# Patient Record
Sex: FEMALE | Race: WHITE | URBAN - METROPOLITAN AREA
[De-identification: names, ages, dates, MRNs, and addresses within clinical notes are randomized per-mention and may not be internally consistent; named-entity substitution may affect disease eponyms.]

---

## 2017-05-09 ENCOUNTER — TRANSFERRED RECORDS (OUTPATIENT)
Dept: HEALTH INFORMATION MANAGEMENT | Facility: CLINIC | Age: 82
End: 2017-05-09

## 2017-09-15 ENCOUNTER — OFFICE VISIT (OUTPATIENT)
Dept: OPHTHALMOLOGY | Facility: CLINIC | Age: 82
End: 2017-09-15

## 2017-09-15 DIAGNOSIS — H52.223 REGULAR ASTIGMATISM, BILATERAL: ICD-10-CM

## 2017-09-15 DIAGNOSIS — H35.369 DRUSEN (DEGENERATIVE) OF RETINA, UNSPECIFIED LATERALITY: ICD-10-CM

## 2017-09-15 DIAGNOSIS — H16.229 KERATITIS SICCA: ICD-10-CM

## 2017-09-15 DIAGNOSIS — H25.10 SENILE NUCLEAR SCLEROSIS, UNSPECIFIED LATERALITY: ICD-10-CM

## 2017-09-15 DIAGNOSIS — H26.499 PCO (POSTERIOR CAPSULAR OPACIFICATION), UNSPECIFIED LATERALITY: Primary | ICD-10-CM

## 2017-09-15 ASSESSMENT — EXTERNAL EXAM - RIGHT EYE: OD_EXAM: NORMAL

## 2017-09-15 ASSESSMENT — CONF VISUAL FIELD
OD_NORMAL: 1
OS_NORMAL: 1
METHOD: COUNTING FINGERS

## 2017-09-15 ASSESSMENT — REFRACTION
OS_ADD: +2.75
OD_AXIS: 017
OD_ADD: +2.75
OS_CYLINDER: +1.75
OS_AXIS: 156
OD_SPHERE: -0.25
OS_SPHERE: -0.25
OD_CYLINDER: +0.75

## 2017-09-15 ASSESSMENT — REFRACTION_MANIFEST
OS_SPHERE: PLANO
OD_AXIS: 180
OS_AXIS: 150
OS_ADD: +2.75
OD_ADD: +2.75
OD_SPHERE: PLANO
OS_CYLINDER: +1.75
OD_CYLINDER: +0.75

## 2017-09-15 ASSESSMENT — CUP TO DISC RATIO
OS_RATIO: 0.25
OD_RATIO: 0.25

## 2017-09-15 ASSESSMENT — REFRACTION_WEARINGRX
OD_SPHERE: PLANO
SPECS_TYPE: PAL
OS_ADD: +2.75
OS_AXIS: 155
OD_CYLINDER: +0.50
OD_AXIS: 008
OD_ADD: +2.75
OS_CYLINDER: +0.75
OS_SPHERE: -0.75

## 2017-09-15 ASSESSMENT — TONOMETRY
OS_IOP_MMHG: 12
OD_IOP_MMHG: 15
IOP_METHOD: ICARE

## 2017-09-15 ASSESSMENT — VISUAL ACUITY
OS_SC: 20/40
METHOD: SNELLEN - LINEAR
OD_CC+: -2
OD_CC: 20/25
CORRECTION_TYPE: GLASSES
OS_SC+: -2

## 2017-09-15 ASSESSMENT — SLIT LAMP EXAM - LIDS
COMMENTS: NORMAL
COMMENTS: NORMAL

## 2017-09-15 ASSESSMENT — EXTERNAL EXAM - LEFT EYE: OS_EXAM: NORMAL

## 2017-09-15 NOTE — MR AVS SNAPSHOT
After Visit Summary   9/15/2017    Hilda Garcia    MRN: [id number]           Patient Information     Date Of Birth          1935        Visit Information        Provider Department      9/15/2017 2:00 PM Dmitriy Jeffers MD [facility]  [facility]        Today's Diagnoses     PCO (posterior capsular opacification), unspecified laterality - Left Eye    -  1    Drusen (degenerative) of retina, unspecified laterality - Both Eyes        Keratitis sicca - Both Eyes        Regular astigmatism, bilateral - Both Eyes        Senile nuclear sclerosis, unspecified laterality - Right Eye           Follow-ups after your visit        Follow-up notes from your care team     Return in about 2 months (around 11/15/2017) for Follow Up, PCO, Kerattis Sicca.      Your next 10 appointments already scheduled     Nov 22, 2017 11:00 AM CST   Return Visit with Dmitriy Jeffers MD   [facility])    [facility]  [address] [address] [phone]              Who to contact     Please call your clinic at [phone] to:    Ask questions about your health    Make or cancel appointments    Discuss your medicines    Learn about your test results    Speak to your doctor   If you have compliments or concerns about an experience at your clinic, or if you wish to file a complaint, please contact [facility] Patient Relations at [phone] or email us at [email or web address]         Additional Information About Your Visit        MyChart Information     Lending Workst is an electronic gateway that provides easy, online access to your medical records. With Guidefitter, you can request a clinic appointment, read your test results, renew a prescription or communicate with your care team.     To sign up for Lending Workst visit the website at [email or web address]   You will  be asked to enter the access code listed below, as well as some personal information. Please follow the directions to create your username and password.     Your access code is: [email or web address]  Expires: 2017  6:30 AM     Your access code will  in 90 days. If you need help or a new code, please contact your [facility] or call [phone] for assistance.        Care EveryWhere ID     This is your Care EveryWhere ID. This could be used by other organizations to access your [place] medical records  [id number]         Blood Pressure from Last 3 Encounters:   No data found for BP    Weight from Last 3 Encounters:   No data found for Wt              We Performed the Following     REFRACTION        Primary Care Provider    None Specified       No primary provider on file.        Equal Access to Services     ROBERTA ESPOSITO : Hadii ray Johnson, waurielda lujoeadaha, qaybta kaalmada mehreen, nils casillas . So [facility] [phone].    ATENCIÓN: Si habla español, tiene a ho disposición servicios gratuitos de asistencia lingüística. Llame al [phone].    We comply with applicable federal civil rights laws and [place] laws. We do not discriminate on the basis of race, color, national origin, age, disability sex, sexual orientation or gender identity.            Thank you!     Thank you for choosing [facility]  for your care. Our goal is always to provide you with excellent care. Hearing back from our patients is one way we can continue to improve our services. Please take a few minutes to complete the written survey that you may receive in the mail after your visit with us. Thank you!             Your Updated Medication List - Protect others around you: Learn how to safely use, store and throw away your medicines at www.disposemymeds.org.      Notice  As of 9/15/2017 11:59 PM    You have not been prescribed any  medications.

## 2017-09-15 NOTE — NURSING NOTE
Chief Complaints and History of Present Illnesses   Patient presents with     Second Opinion     HPI    Affected eye(s):  Left   Symptoms:     Decreased vision      Duration:  1 year   Frequency:  Constant       Do you have eye pain now?:  Yes   Location:  OS   Pain Level:  No Pain (1)   Pain Duration:  1 year   Pain Characteristics:  Aching   Other:  relief with cold pack      Comments:  CE/IOL of LE one year ago / complications of immediate poor VA / 'film over VF' / saw Retina specialist 3 weeks ago / told scar tissue underneath the lens.  Cataract in RE   Early dry AMD in BE / states has cyn Lozano COT 2:13 PM 09/15/2017

## 2017-09-17 NOTE — PROGRESS NOTES
Assessment & Plan      Hilda Garcia is a 81 year old female with the following diagnoses:   (H26.499) PCO (posterior capsular opacification), unspecified laterality - Left Eye  (primary encounter diagnosis)  Comment: Moderate  Plan: Will likely need YAG soon    (H35.369) Drusen (degenerative) of retina, unspecified laterality - Both Eyes  Comment: Mild  Plan: AREDS-2 bid    (H16.9) Keratitis sicca - Both Eyes  Comment: Moderate  Plan: Preservative free AT's    (H52.223) Regular astigmatism, bilateral - Both Eyes  Comment:Change  Plan: REFRACTION        Rx    (H25.10) Senile nuclear sclerosis, unspecified laterality - Right Eye  Comment: Mild  Plan: Follow     -----------------------------------------------------------------------------------      Patient disposition:   Return in about 2 months (around 11/15/2017) for Follow Up, PCO, Kerattis Sicca. or sooner as needed.    Complete documentation of historical and exam elements from today's encounter can  be found in the full encounter summary report (not reduplicated in this progress  note). I personally obtained the chief complaint(s) and history of present illness. I  confirmed and edited as necessary the review of systems, past medical/surgical  history, family history, social history, and examination findings as documented by  others; and I examined the patient myself. I personally reviewed the relevant tests,  images, and reports as documented above. I formulated and edited as necessary the  assessment and plan and discussed the findings and management plan with the  patient and family.    WANG Jeffers M.D

## 2017-11-22 ENCOUNTER — OFFICE VISIT (OUTPATIENT)
Dept: OPHTHALMOLOGY | Facility: CLINIC | Age: 82
End: 2017-11-22

## 2017-11-22 DIAGNOSIS — H26.499 PCO (POSTERIOR CAPSULAR OPACIFICATION), UNSPECIFIED LATERALITY: Primary | ICD-10-CM

## 2017-11-22 ASSESSMENT — TONOMETRY
IOP_METHOD: ICARE
OS_IOP_MMHG: 11
OD_IOP_MMHG: 16

## 2017-11-22 ASSESSMENT — REFRACTION_WEARINGRX
OD_SPHERE: -0.25
SPECS_TYPE: PAL
OD_ADD: +2.75
OS_CYLINDER: +1.75
OD_CYLINDER: +0.75
OS_AXIS: 156
OS_SPHERE: -0.25
OD_AXIS: 017
OS_ADD: +2.75

## 2017-11-22 ASSESSMENT — CONF VISUAL FIELD
OD_NORMAL: 1
METHOD: COUNTING FINGERS
OS_NORMAL: 1

## 2017-11-22 ASSESSMENT — EXTERNAL EXAM - LEFT EYE: OS_EXAM: NORMAL

## 2017-11-22 ASSESSMENT — SLIT LAMP EXAM - LIDS
COMMENTS: NORMAL
COMMENTS: NORMAL

## 2017-11-22 ASSESSMENT — VISUAL ACUITY
CORRECTION_TYPE: GLASSES
OS_CC: 20/25-3
METHOD: SNELLEN - LINEAR
OD_CC: 20/30+

## 2017-11-22 ASSESSMENT — EXTERNAL EXAM - RIGHT EYE: OD_EXAM: NORMAL

## 2017-11-22 NOTE — PROGRESS NOTES
Assessment & Plan      Hilda Garcia is a 82 year old female with the following diagnoses:   (H26.499) PCO (posterior capsular opacification), unspecified laterality - Left Eye  (primary encounter diagnosis)  Comment: Significant visual impairment  Plan: YAG Capsulotomy OS (left eye)        Decision to do surgery made at today's visit.  Patient's dry ARMD discussed as possible limitation to final VA      -----------------------------------------------------------------------------------      Patient disposition:   Return in about 2 weeks (around 12/6/2017) for Follow Up-YAG Capsulotomy OS. or sooner as needed.    Complete documentation of historical and exam elements from today's encounter can  be found in the full encounter summary report (not reduplicated in this progress  note). I personally obtained the chief complaint(s) and history of present illness. I  confirmed and edited as necessary the review of systems, past medical/surgical  history, family history, social history, and examination findings as documented by  others; and I examined the patient myself. I personally reviewed the relevant tests,  images, and reports as documented above. I formulated and edited as necessary the  assessment and plan and discussed the findings and management plan with the  patient and family.    WANG Jeffers M.D

## 2017-11-22 NOTE — MR AVS SNAPSHOT
After Visit Summary   2017    Hilda Garcia    MRN: [id number]           Patient Information     Date Of Birth          1935        Visit Information        Provider Department      2017 10:40 AM Dmitriy Jeffers MD [facility]        Today's Diagnoses     PCO (posterior capsular opacification), unspecified laterality - Left Eye    -  1       Follow-ups after your visit        Your next 10 appointments already scheduled     Dec 05, 2017  9:00 AM CST   Return Visit with Dmitriy Jeffers MD   [facility]  [facility] ([facility])    [facility]  [address] [facility] [address]  [facility] [address] [phone]              Who to contact     Please call your clinic at [phone] to:    Ask questions about your health    Make or cancel appointments    Discuss your medicines    Learn about your test results    Speak to your doctor   If you have compliments or concerns about an experience at your clinic, or if you wish to file a complaint, please contact [facility] Patient Relations at [phone] or email us at [email or web address]         Additional Information About Your Visit        MyChart Information     Fliqqhart is an electronic gateway that provides easy, online access to your medical records. With Wochacha, you can request a clinic appointment, read your test results, renew a prescription or communicate with your care team.     To sign up for Mediaflyt visit the website at [email or web address] SMSt   You will be asked to enter the access code listed below, as well as some personal information. Please follow the directions to create your username and password.     Your access code is: [email or web address]  Expires: 2017  5:30 AM     Your access code will  in 90 days. If you need help or a new code, please contact your [facility]  Clinic or call [phone] for assistance.        Care EveryWhere ID     This is your Care EveryWhere ID. This could be used by other organizations to access your [place] medical records  [id number]         Blood Pressure from Last 3 Encounters:   No data found for BP    Weight from Last 3 Encounters:   No data found for Wt              We Performed the Following     YAG Capsulotomy OS (left eye)        Primary Care Provider    None Specified       No primary provider on file.        Equal Access to Services     GILMA [facility] HospitalMARSHALL : Hadii aad ku hadmorroo Sorashadali, waaxda luqadaha, qaybta kaalmada adesamda, nils bethbethalicia casillas . So [facility] [place] [facility] [phone].    ATENCIÓN: Si habla espchitra, tiene a ho disposición servicios gratuitos de asistencia lingüística. Llame al [phone].    We comply with applicable federal civil rights laws and [place] laws. We do not discriminate on the basis of race, color, national origin, age, disability, sex, sexual orientation, or gender identity.            Thank you!     Thank you for choosing [facility] A [facility]  for your care. Our goal is always to provide you with excellent care. Hearing back from our patients is one way we can continue to improve our services. Please take a few minutes to complete the written survey that you may receive in the mail after your visit with us. Thank you!             Your Updated Medication List - Protect others around you: Learn how to safely use, store and throw away your medicines at www.disposemymeds.org.      Notice  As of 11/22/2017 11:21 AM    You have not been prescribed any medications.

## 2017-11-22 NOTE — NURSING NOTE
Chief Complaints and History of Present Illnesses   Patient presents with     Follow Up For     PCO of LE      HPI    Affected eye(s):  Left   Symptoms:     Blurred vision      Duration:  2 months   Frequency:  Constant       Do you have eye pain now?:  No      Comments:  Pt states she hasn't noticed any vision changes since last visit, VA is poor in LE, interested in getting YAG laser done today     TAQUERIA Davis 9:54 AM 11/22/2017

## 2017-12-20 ENCOUNTER — OFFICE VISIT (OUTPATIENT)
Dept: OPHTHALMOLOGY | Facility: CLINIC | Age: 82
End: 2017-12-20
Payer: MEDICARE

## 2017-12-20 DIAGNOSIS — H26.499 PCO (POSTERIOR CAPSULAR OPACIFICATION), UNSPECIFIED LATERALITY: Primary | ICD-10-CM

## 2017-12-20 ASSESSMENT — VISUAL ACUITY
OD_SC: 20/20
OS_SC+: -2/+3
CORRECTION_TYPE: GLASSES
OD_SC+: -2
OS_SC: 20/30
METHOD: SNELLEN - LINEAR

## 2017-12-20 ASSESSMENT — REFRACTION_MANIFEST
OD_AXIS: 017
OD_SPHERE: PLANO
OS_AXIS: 155
OS_CYLINDER: +1.75
OS_SPHERE: -0.50
OS_ADD: +2.75
OD_ADD: +2.75
OD_CYLINDER: +0.75

## 2017-12-20 ASSESSMENT — REFRACTION_WEARINGRX
OS_AXIS: 156
OS_CYLINDER: +1.75
OD_ADD: +2.75
SPECS_TYPE: PAL
OS_ADD: +2.75
OD_AXIS: 017
OD_CYLINDER: +0.75
OD_SPHERE: PLANO
OS_SPHERE: -0.25

## 2017-12-20 ASSESSMENT — CONF VISUAL FIELD
OD_NORMAL: 1
OS_NORMAL: 1

## 2017-12-20 ASSESSMENT — SLIT LAMP EXAM - LIDS
COMMENTS: NORMAL
COMMENTS: NORMAL

## 2017-12-20 ASSESSMENT — EXTERNAL EXAM - LEFT EYE: OS_EXAM: NORMAL

## 2017-12-20 ASSESSMENT — TONOMETRY
IOP_METHOD: ICARE
OS_IOP_MMHG: 14
OD_IOP_MMHG: 18

## 2017-12-20 ASSESSMENT — EXTERNAL EXAM - RIGHT EYE: OD_EXAM: NORMAL

## 2017-12-20 NOTE — NURSING NOTE
Chief Complaints and History of Present Illnesses   Patient presents with     Post-op Yag Capsulotomy     of LE     HPI    Affected eye(s):  Left   Symptoms:     Blurred vision   No floaters   No flashes   Dryness      Duration:  1 month   Frequency:  Constant       Do you have eye pain now?:  No      Comments:  Still notes a film over LE / always instilling art tears 5 to 6 times a day / film doesn't go away, but eye still feels better.  Zaida FARLEY 9:49 AM 12/20/2017

## 2017-12-20 NOTE — MR AVS SNAPSHOT
After Visit Summary   2017    Hilda Garcia    MRN: [id number]           Patient Information     Date Of Birth          1935        Visit Information        Provider Department      2017 9:40 AM Dmitriy Jeffers MD [facility]        Today's Diagnoses     PCO (posterior capsular opacification), unspecified laterality - Left Eye    -  1       Follow-ups after your visit        Follow-up notes from your care team     Return in about 10 months (around 10/20/2018) for Complete Eye Exam.      Who to contact     Please call your clinic at [phone] to:    Ask questions about your health    Make or cancel appointments    Discuss your medicines    Learn about your test results    Speak to your doctor   If you have compliments or concerns about an experience at your clinic, or if you wish to file a complaint, please contact [facility] Patient Relations at [phone] or email us at [email or web address]         Additional Information About Your Visit        MyChart Information     YouDocs Beauty is an electronic gateway that provides easy, online access to your medical records. With YouDocs Beauty, you can request a clinic appointment, read your test results, renew a prescription or communicate with your care team.     To sign up for YouDocs Beauty visit the website at www.[email or web address]/[email or web address]   You will be asked to enter the access code listed below, as well as some personal information. Please follow the directions to create your username and password.     Your access code is: [email or web address]  Expires: 3/6/2018  6:30 AM     Your access code will  in 90 days. If you need help or a new code, please contact your [facility] or call [phone] for assistance.        Care EveryWhere ID     This is your Care EveryWhere ID. This could be used by other organizations to access your [facility]  records  [id number]         Blood Pressure from Last 3 Encounters:   No data found for BP    Weight from Last 3 Encounters:   No data found for Wt              Today, you had the following     No orders found for display       Primary Care Provider    None Specified       No primary provider on file.        Equal Access to Services     ROBERTA ESPOSITO : Hadluigi ray zimmerman rico Soomaali, waurielda luqadaha, qaybta kaalmada adesamda, nils jessicain hayaasuzie flowersezra weir vinny . So [facility] [phone].    ATENCIÓN: Si habla español, tiene a ho disposición servicios gratuitos de asistencia lingüística. Llame al [phone].    We comply with applicable federal civil rights laws and [place] laws. We do not discriminate on the basis of race, color, national origin, age, disability, sex, sexual orientation, or gender identity.            Thank you!     Thank you for choosing [facility]  for your care. Our goal is always to provide you with excellent care. Hearing back from our patients is one way we can continue to improve our services. Please take a few minutes to complete the written survey that you may receive in the mail after your visit with us. Thank you!             Your Updated Medication List - Protect others around you: Learn how to safely use, store and throw away your medicines at www.disposemymeds.org.      Notice  As of 12/20/2017 11:59 PM    You have not been prescribed any medications.

## 2017-12-31 NOTE — PROGRESS NOTES
Assessment & Plan      Hilda Garcia is a 82 year old female with the following diagnoses:   (H26.499) PCO (posterior capsular opacification), unspecified laterality - Left Eye  (primary encounter diagnosis)  Comment: Excellent result of YAG  Plan: Change left lens     -----------------------------------------------------------------------------------      Patient disposition:   Return in about 10 months (around 10/20/2018) for Complete Eye Exam. or sooner as needed.    Complete documentation of historical and exam elements from today's encounter can  be found in the full encounter summary report (not reduplicated in this progress  note). I personally obtained the chief complaint(s) and history of present illness. I  confirmed and edited as necessary the review of systems, past medical/surgical  history, family history, social history, and examination findings as documented by  others; and I examined the patient myself. I personally reviewed the relevant tests,  images, and reports as documented above. I formulated and edited as necessary the  assessment and plan and discussed the findings and management plan with the  patient and family.    WANG Jeffers M.D

## 2018-02-13 ENCOUNTER — OFFICE VISIT (OUTPATIENT)
Dept: OPHTHALMOLOGY | Facility: CLINIC | Age: 83
End: 2018-02-13
Payer: MEDICARE

## 2018-02-13 DIAGNOSIS — H52.13 MYOPIA, BILATERAL: ICD-10-CM

## 2018-02-13 DIAGNOSIS — H25.10 SENILE NUCLEAR SCLEROSIS, UNSPECIFIED LATERALITY: ICD-10-CM

## 2018-02-13 DIAGNOSIS — H16.229 KERATITIS SICCA: ICD-10-CM

## 2018-02-13 DIAGNOSIS — H35.369 DRUSEN (DEGENERATIVE) OF RETINA, UNSPECIFIED LATERALITY: Primary | ICD-10-CM

## 2018-02-13 RX ORDER — ESTRADIOL 10 UG/1
10 INSERT VAGINAL
COMMUNITY
Start: 2017-08-24

## 2018-02-13 RX ORDER — MAGNESIUM GLYCINATE 100 MG
1-4 TABLET ORAL
COMMUNITY
Start: 2017-09-06

## 2018-02-13 RX ORDER — CHLORAL HYDRATE 500 MG
2000 CAPSULE ORAL
COMMUNITY
Start: 2017-09-06

## 2018-02-13 ASSESSMENT — SLIT LAMP EXAM - LIDS
COMMENTS: NORMAL
COMMENTS: NORMAL

## 2018-02-13 ASSESSMENT — VISUAL ACUITY
OS_CC: 20/20-2
METHOD: SNELLEN - LINEAR
OD_CC: 20/30-2/+
CORRECTION_TYPE: GLASSES

## 2018-02-13 ASSESSMENT — REFRACTION_MANIFEST
OD_CYLINDER: +1.00
OD_SPHERE: -0.75
OD_AXIS: 017

## 2018-02-13 ASSESSMENT — EXTERNAL EXAM - LEFT EYE: OS_EXAM: NORMAL

## 2018-02-13 ASSESSMENT — REFRACTION_WEARINGRX
OD_AXIS: 017
OS_CYLINDER: +1.75
OD_CYLINDER: +0.75
OD_SPHERE: PLANO
OS_ADD: +2.75
SPECS_TYPE: PAL
OD_ADD: +2.75
OS_AXIS: 155
OS_SPHERE: -0.50

## 2018-02-13 ASSESSMENT — CONF VISUAL FIELD
OD_NORMAL: 1
OS_NORMAL: 1
METHOD: COUNTING FINGERS

## 2018-02-13 ASSESSMENT — CUP TO DISC RATIO
OD_RATIO: 0.25
OS_RATIO: 0.25

## 2018-02-13 ASSESSMENT — TONOMETRY
OS_IOP_MMHG: 14
IOP_METHOD: ICARE
OD_IOP_MMHG: 19

## 2018-02-13 ASSESSMENT — EXTERNAL EXAM - RIGHT EYE: OD_EXAM: NORMAL

## 2018-02-13 NOTE — MR AVS SNAPSHOT
After Visit Summary   2018    Hilda Garcia    MRN: [id number]           Patient Information     Date Of Birth          1935        Visit Information        Provider Department      2018 9:20 AM Paris Kathleen MD [facility]        Today's Diagnoses     Drusen (degenerative) of retina, unspecified laterality - Both Eyes    -  1    Keratitis sicca - Both Eyes        Senile nuclear sclerosis, unspecified laterality - Right Eye        Myopia, bilateral - Both Eyes           Follow-ups after your visit        Follow-up notes from your care team     Return in about 1 year (around 2019) for Comprehensive Exam.      Who to contact     Please call your clinic at [phone] to:    Ask questions about your health    Make or cancel appointments    Discuss your medicines    Learn about your test results    Speak to your doctor            Additional Information About Your Visit        MyChart Information     Responsive Energy Groupt is an electronic gateway that provides easy, online access to your medical records. With LendPro, you can request a clinic appointment, read your test results, renew a prescription or communicate with your care team.     To sign up for Responsive Energy Groupt visit the website at [email or web address]   You will be asked to enter the access code listed below, as well as some personal information. Please follow the directions to create your username and password.     Your access code is: [email or web address]  Expires: 3/6/2018  6:30 AM     Your access code will  in 90 days. If you need help or a new code, please contact your [facility] or call [phone] for assistance.        Care EveryWhere ID     This is your Care EveryWhere ID. This could be used by other organizations to access your [place] medical records  [id number]         Blood Pressure from Last 3 Encounters:   No data found for BP    Weight from Last 3  Encounters:   No data found for Wt              We Performed the Following     OCT Retina Spectralis OU (both eyes)        Primary Care Provider    None Specified       No primary provider on file.        Equal Access to Services     ROBERTA ESPOSITO : Hipolito Johnson, brendon holder, stew martinezmachaim flowerssamchaim, waxdelbert jessicain hayaan lionelezra weir lanolansuzie jarmaillo. So [facility] [phone].    ATENCIÓN: Si habla español, tiene a ho disposición servicios gratuitos de asistencia lingüística. Llame al [phone].    We comply with applicable federal civil rights laws and [place] laws. We do not discriminate on the basis of race, color, national origin, age, disability, sex, sexual orientation, or gender identity.            Thank you!     Thank you for choosing [facility]  for your care. Our goal is always to provide you with excellent care. Hearing back from our patients is one way we can continue to improve our services. Please take a few minutes to complete the written survey that you may receive in the mail after your visit with us. Thank you!             Your Updated Medication List - Protect others around you: Learn how to safely use, store and throw away your medicines at www.disposemymeds.org.          This list is accurate as of 2/13/18  4:23 PM.  Always use your most recent med list.                   Brand Name Dispense Instructions for use Diagnosis    D 5000 5000 UNITS Tabs   Generic drug:  Cholecalciferol      Take 5,000 Units by mouth        estradiol 10 MCG Tabs vaginal tablet    VAGIFEM     Place 10 mcg vaginally        EYE VITAMINS PO      Focus eye vitamin        Mag Glycinate 100 MG Tabs      Take 1-4 tablets by mouth        Omega-3 1000 MG Caps      Take 2,000 mg by mouth        * UNABLE TO FIND      Orthobiotic        * UNABLE TO FIND      probono        * Notice:  This list has 2 medication(s) that are the same as other medications prescribed for you. Read the  directions carefully, and ask your doctor or other care provider to review them with you.

## 2018-02-13 NOTE — NURSING NOTE
Chief Complaints and History of Present Illnesses   Patient presents with     Blurred Vision Left Eye     HPI    Affected eye(s):  Left   Symptoms:     Blurred vision      Duration:  2 years   Frequency:  Constant       Do you have eye pain now?:  No      Comments:  PT had cataract surgery at [facility] 2016 Left eye, and had laser for PCO 11/22/2017 by Dr. Jeffers.     She feels her vision is still worse than it was prior to the cataract surgery.

## 2018-02-13 NOTE — PROGRESS NOTES
HPI  Hilda Garcia is a 82 year old female here for blurry vision left eye.  Patient feels that at first, after YAG capsulotomy left eye and lens change that her vision was improved.  Then, over the past month feels that vision not as good left eye.  Was surprised that she couldn't read the chart better right eye today, didn't note blurry vision right eye at home.  Noted new floaters left eye about every 3 days for the last 2 weeks, no flashes.  Overall doesn't think the vision left eye is any better than before cataract surgery.    PMH:  djd   POH:  Glasses for myopic/astig/presby, drusen both eyes (sees Dr. Lambert annually), cataract extraction left eye ([facility]), YAG capsulotomy left eye Dr. Jeffers 11/17, no trauma  Oc Meds:  Frequent preservative-free artificial tears daily    Assessment & Plan      (H35.369) Drusen (degenerative) of retina, unspecified laterality - Both Eyes  (primary encounter diagnosis)  Comment: follows with Dr. Quigley annually (summertime), likely cause of visual acuity changes despite normal [place]  Plan: follow-up with Dr. Quigley as scheduled, continue AREDS2 vitamins, call with vision changes    (H16.9) Keratitis sicca - Both Eyes  Comment: mild signs os  Plan: increase artificial tear drop use    (H25.10) Senile nuclear sclerosis, unspecified laterality - Right Eye  Comment: likely minimally visually significant  Plan: follow    (H52.13) Myopia, bilateral - Both Eyes  Comment: mild shift right eye due to cataract, likely not worth changing lens yet  Plan: prescription on file      -----------------------------------------------------------------------------------    Patient disposition:   Return in about 1 year (around 2/13/2019) for Comprehensive Exam. Call for sooner appointment as needed.    Complete documentation of historical and exam elements from today's encounter can be found in the full encounter summary report (not reduplicated in this progress note). I personally obtained  the chief complaint(s) and history of present illness.  I have confirmed and edited as necessary the CC, HPI, PMH/PSH, social history, FMH, ROS, and exam/neuro findings as obtained by the technician or others. I have examined this patient myself and I personally viewed the image(s) and studies listed above and the documentation reflects my findings and interpretation.

## 2018-02-15 ENCOUNTER — TELEPHONE (OUTPATIENT)
Dept: OPHTHALMOLOGY | Facility: CLINIC | Age: 83
End: 2018-02-15

## 2018-02-23 ENCOUNTER — TELEPHONE (OUTPATIENT)
Dept: OPHTHALMOLOGY | Facility: CLINIC | Age: 83
End: 2018-02-23

## 2018-02-23 DIAGNOSIS — H16.229 KERATITIS SICCA: Primary | ICD-10-CM

## 2018-02-23 NOTE — TELEPHONE ENCOUNTER
Lm with patient that at last appointment I recommended artificial tear drops over the counter and if she's interested in prescription for dry eyes to call back and we will discuss other options.

## 2021-06-02 ENCOUNTER — RECORDS - HEALTHEAST (OUTPATIENT)
Dept: ADMINISTRATIVE | Facility: CLINIC | Age: 86
End: 2021-06-02

## 2023-05-25 ENCOUNTER — LAB REQUISITION (OUTPATIENT)
Dept: LAB | Facility: CLINIC | Age: 88
End: 2023-05-25
Payer: COMMERCIAL

## 2023-05-25 DIAGNOSIS — F03.90 UNSPECIFIED DEMENTIA, UNSPECIFIED SEVERITY, WITHOUT BEHAVIORAL DISTURBANCE, PSYCHOTIC DISTURBANCE, MOOD DISTURBANCE, AND ANXIETY (H): ICD-10-CM

## 2023-06-02 LAB
ALBUMIN SERPL BCG-MCNC: 4 G/DL (ref 3.5–5.2)
ALP SERPL-CCNC: 114 U/L (ref 35–104)
ALT SERPL W P-5'-P-CCNC: 44 U/L (ref 10–35)
ANION GAP SERPL CALCULATED.3IONS-SCNC: 13 MMOL/L (ref 7–15)
AST SERPL W P-5'-P-CCNC: 38 U/L (ref 10–35)
BILIRUB SERPL-MCNC: 0.6 MG/DL
BUN SERPL-MCNC: 17.5 MG/DL (ref 8–23)
CALCIUM SERPL-MCNC: 9.3 MG/DL (ref 8.8–10.2)
CHLORIDE SERPL-SCNC: 96 MMOL/L (ref 98–107)
CREAT SERPL-MCNC: 0.7 MG/DL (ref 0.51–0.95)
DEPRECATED CALCIDIOL+CALCIFEROL SERPL-MC: 45 UG/L (ref 20–75)
DEPRECATED HCO3 PLAS-SCNC: 26 MMOL/L (ref 22–29)
ERYTHROCYTE [DISTWIDTH] IN BLOOD BY AUTOMATED COUNT: 15.7 % (ref 10–15)
GFR SERPL CREATININE-BSD FRML MDRD: 83 ML/MIN/1.73M2
GLUCOSE SERPL-MCNC: 81 MG/DL (ref 70–99)
HBA1C MFR BLD: 6.3 %
HCT VFR BLD AUTO: 43.5 % (ref 35–47)
HGB BLD-MCNC: 13.4 G/DL (ref 11.7–15.7)
MCH RBC QN AUTO: 31.2 PG (ref 26.5–33)
MCHC RBC AUTO-ENTMCNC: 30.8 G/DL (ref 31.5–36.5)
MCV RBC AUTO: 101 FL (ref 78–100)
PLATELET # BLD AUTO: 203 10E3/UL (ref 150–450)
POTASSIUM SERPL-SCNC: 3.8 MMOL/L (ref 3.4–5.3)
PROT SERPL-MCNC: 6.2 G/DL (ref 6.4–8.3)
RBC # BLD AUTO: 4.3 10E6/UL (ref 3.8–5.2)
SODIUM SERPL-SCNC: 135 MMOL/L (ref 136–145)
TSH SERPL DL<=0.005 MIU/L-ACNC: 3.77 UIU/ML (ref 0.3–4.2)
VIT B12 SERPL-MCNC: 485 PG/ML (ref 232–1245)
WBC # BLD AUTO: 8 10E3/UL (ref 4–11)

## 2023-06-02 PROCEDURE — P9603 ONE-WAY ALLOW PRORATED MILES: HCPCS | Mod: ORL

## 2023-06-02 PROCEDURE — 82607 VITAMIN B-12: CPT | Mod: ORL

## 2023-06-02 PROCEDURE — 80053 COMPREHEN METABOLIC PANEL: CPT | Mod: ORL

## 2023-06-02 PROCEDURE — 84443 ASSAY THYROID STIM HORMONE: CPT | Mod: ORL

## 2023-06-02 PROCEDURE — 83036 HEMOGLOBIN GLYCOSYLATED A1C: CPT | Mod: ORL

## 2023-06-02 PROCEDURE — 85027 COMPLETE CBC AUTOMATED: CPT | Mod: ORL

## 2023-06-02 PROCEDURE — 82306 VITAMIN D 25 HYDROXY: CPT | Mod: ORL

## 2023-06-02 PROCEDURE — 36415 COLL VENOUS BLD VENIPUNCTURE: CPT | Mod: ORL

## 2023-07-31 ENCOUNTER — LAB REQUISITION (OUTPATIENT)
Dept: LAB | Facility: CLINIC | Age: 88
End: 2023-07-31
Payer: COMMERCIAL

## 2023-07-31 DIAGNOSIS — R35.0 FREQUENCY OF MICTURITION: ICD-10-CM

## 2023-07-31 LAB
ALBUMIN UR-MCNC: NEGATIVE MG/DL
APPEARANCE UR: CLEAR
BILIRUB UR QL STRIP: NEGATIVE
CAOX CRY #/AREA URNS HPF: ABNORMAL /HPF
COLOR UR AUTO: YELLOW
GLUCOSE UR STRIP-MCNC: NEGATIVE MG/DL
HGB UR QL STRIP: NEGATIVE
KETONES UR STRIP-MCNC: NEGATIVE MG/DL
LEUKOCYTE ESTERASE UR QL STRIP: NEGATIVE
MUCOUS THREADS #/AREA URNS LPF: PRESENT /LPF
NITRATE UR QL: NEGATIVE
PH UR STRIP: 7 [PH] (ref 5–7)
RBC URINE: <1 /HPF
SP GR UR STRIP: 1.02 (ref 1–1.03)
SQUAMOUS EPITHELIAL: <1 /HPF
UROBILINOGEN UR STRIP-MCNC: NORMAL MG/DL
WBC URINE: 2 /HPF

## 2023-07-31 PROCEDURE — 87086 URINE CULTURE/COLONY COUNT: CPT | Mod: ORL

## 2023-07-31 PROCEDURE — 81001 URINALYSIS AUTO W/SCOPE: CPT | Mod: ORL

## 2023-08-02 LAB — BACTERIA UR CULT: NO GROWTH

## 2023-12-22 ENCOUNTER — LAB REQUISITION (OUTPATIENT)
Dept: LAB | Facility: CLINIC | Age: 88
End: 2023-12-22
Payer: COMMERCIAL

## 2023-12-22 DIAGNOSIS — R35.0 FREQUENCY OF MICTURITION: ICD-10-CM

## 2023-12-22 LAB
ALBUMIN UR-MCNC: 20 MG/DL
APPEARANCE UR: ABNORMAL
BILIRUB UR QL STRIP: NEGATIVE
CAOX CRY #/AREA URNS HPF: ABNORMAL /HPF
COLOR UR AUTO: YELLOW
GLUCOSE UR STRIP-MCNC: NEGATIVE MG/DL
HGB UR QL STRIP: NEGATIVE
KETONES UR STRIP-MCNC: NEGATIVE MG/DL
LEUKOCYTE ESTERASE UR QL STRIP: ABNORMAL
MUCOUS THREADS #/AREA URNS LPF: PRESENT /LPF
NITRATE UR QL: NEGATIVE
PH UR STRIP: 5.5 [PH] (ref 5–7)
RBC URINE: 2 /HPF
SP GR UR STRIP: 1.03 (ref 1–1.03)
SQUAMOUS EPITHELIAL: 1 /HPF
UROBILINOGEN UR STRIP-MCNC: NORMAL MG/DL
WBC URINE: 0 /HPF

## 2023-12-22 PROCEDURE — 87086 URINE CULTURE/COLONY COUNT: CPT | Mod: ORL

## 2023-12-22 PROCEDURE — 81001 URINALYSIS AUTO W/SCOPE: CPT | Mod: ORL

## 2023-12-23 LAB — BACTERIA UR CULT: NORMAL

## 2024-01-04 ENCOUNTER — LAB REQUISITION (OUTPATIENT)
Dept: LAB | Facility: CLINIC | Age: 89
End: 2024-01-04
Payer: COMMERCIAL

## 2024-01-04 DIAGNOSIS — I10 ESSENTIAL (PRIMARY) HYPERTENSION: ICD-10-CM

## 2024-01-04 DIAGNOSIS — F03.90 UNSPECIFIED DEMENTIA, UNSPECIFIED SEVERITY, WITHOUT BEHAVIORAL DISTURBANCE, PSYCHOTIC DISTURBANCE, MOOD DISTURBANCE, AND ANXIETY (H): ICD-10-CM

## 2024-01-11 ENCOUNTER — LAB REQUISITION (OUTPATIENT)
Dept: LAB | Facility: CLINIC | Age: 89
End: 2024-01-11
Payer: COMMERCIAL

## 2024-01-11 DIAGNOSIS — I10 ESSENTIAL (PRIMARY) HYPERTENSION: ICD-10-CM

## 2024-01-11 DIAGNOSIS — F03.90 UNSPECIFIED DEMENTIA, UNSPECIFIED SEVERITY, WITHOUT BEHAVIORAL DISTURBANCE, PSYCHOTIC DISTURBANCE, MOOD DISTURBANCE, AND ANXIETY (H): ICD-10-CM

## 2024-01-12 LAB
ALBUMIN SERPL BCG-MCNC: 4.1 G/DL (ref 3.5–5.2)
ALP SERPL-CCNC: 114 U/L (ref 40–150)
ALT SERPL W P-5'-P-CCNC: 23 U/L (ref 0–50)
ANION GAP SERPL CALCULATED.3IONS-SCNC: 13 MMOL/L (ref 7–15)
AST SERPL W P-5'-P-CCNC: 41 U/L (ref 0–45)
BILIRUB SERPL-MCNC: 0.4 MG/DL
BUN SERPL-MCNC: 25.9 MG/DL (ref 8–23)
CALCIUM SERPL-MCNC: 9.2 MG/DL (ref 8.8–10.2)
CHLORIDE SERPL-SCNC: 100 MMOL/L (ref 98–107)
CREAT SERPL-MCNC: 0.69 MG/DL (ref 0.51–0.95)
DEPRECATED HCO3 PLAS-SCNC: 25 MMOL/L (ref 22–29)
EGFRCR SERPLBLD CKD-EPI 2021: 83 ML/MIN/1.73M2
ERYTHROCYTE [DISTWIDTH] IN BLOOD BY AUTOMATED COUNT: 14.9 % (ref 10–15)
GLUCOSE SERPL-MCNC: 130 MG/DL (ref 70–99)
HCT VFR BLD AUTO: 42 % (ref 35–47)
HGB BLD-MCNC: 13.5 G/DL (ref 11.7–15.7)
MCH RBC QN AUTO: 32.4 PG (ref 26.5–33)
MCHC RBC AUTO-ENTMCNC: 32.1 G/DL (ref 31.5–36.5)
MCV RBC AUTO: 101 FL (ref 78–100)
PLATELET # BLD AUTO: 184 10E3/UL (ref 150–450)
POTASSIUM SERPL-SCNC: 4.7 MMOL/L (ref 3.4–5.3)
PROT SERPL-MCNC: 6.8 G/DL (ref 6.4–8.3)
RBC # BLD AUTO: 4.17 10E6/UL (ref 3.8–5.2)
SODIUM SERPL-SCNC: 138 MMOL/L (ref 135–145)
TSH SERPL DL<=0.005 MIU/L-ACNC: 1.79 UIU/ML (ref 0.3–4.2)
VIT B12 SERPL-MCNC: 593 PG/ML (ref 232–1245)
WBC # BLD AUTO: 6.4 10E3/UL (ref 4–11)

## 2024-01-12 PROCEDURE — 80053 COMPREHEN METABOLIC PANEL: CPT | Mod: ORL

## 2024-01-12 PROCEDURE — 84443 ASSAY THYROID STIM HORMONE: CPT | Mod: ORL

## 2024-01-12 PROCEDURE — P9603 ONE-WAY ALLOW PRORATED MILES: HCPCS | Mod: ORL

## 2024-01-12 PROCEDURE — 36415 COLL VENOUS BLD VENIPUNCTURE: CPT | Mod: ORL

## 2024-01-12 PROCEDURE — 82607 VITAMIN B-12: CPT | Mod: ORL

## 2024-01-12 PROCEDURE — 85027 COMPLETE CBC AUTOMATED: CPT | Mod: ORL

## 2024-01-18 ENCOUNTER — LAB REQUISITION (OUTPATIENT)
Dept: LAB | Facility: CLINIC | Age: 89
End: 2024-01-18
Payer: COMMERCIAL

## 2024-01-18 DIAGNOSIS — R35.0 FREQUENCY OF MICTURITION: ICD-10-CM

## 2024-01-18 LAB
ALBUMIN UR-MCNC: NEGATIVE MG/DL
APPEARANCE UR: ABNORMAL
BILIRUB UR QL STRIP: NEGATIVE
CAOX CRY #/AREA URNS HPF: ABNORMAL /HPF
COLOR UR AUTO: YELLOW
GLUCOSE UR STRIP-MCNC: NEGATIVE MG/DL
HGB UR QL STRIP: NEGATIVE
KETONES UR STRIP-MCNC: NEGATIVE MG/DL
LEUKOCYTE ESTERASE UR QL STRIP: NEGATIVE
MUCOUS THREADS #/AREA URNS LPF: PRESENT /LPF
NITRATE UR QL: NEGATIVE
PH UR STRIP: 6.5 [PH] (ref 5–7)
RBC URINE: 1 /HPF
SP GR UR STRIP: 1.02 (ref 1–1.03)
TRANSITIONAL EPI: <1 /HPF
UROBILINOGEN UR STRIP-MCNC: NORMAL MG/DL
WBC URINE: 1 /HPF

## 2024-01-18 PROCEDURE — 81001 URINALYSIS AUTO W/SCOPE: CPT | Mod: ORL

## 2024-01-18 PROCEDURE — 87086 URINE CULTURE/COLONY COUNT: CPT | Mod: ORL

## 2024-01-20 LAB — BACTERIA UR CULT: NORMAL

## 2024-08-13 ENCOUNTER — LAB REQUISITION (OUTPATIENT)
Dept: LAB | Facility: CLINIC | Age: 89
End: 2024-08-13
Payer: COMMERCIAL

## 2024-08-13 DIAGNOSIS — F03.90 UNSPECIFIED DEMENTIA, UNSPECIFIED SEVERITY, WITHOUT BEHAVIORAL DISTURBANCE, PSYCHOTIC DISTURBANCE, MOOD DISTURBANCE, AND ANXIETY (H): ICD-10-CM

## 2024-08-13 DIAGNOSIS — I10 ESSENTIAL (PRIMARY) HYPERTENSION: ICD-10-CM

## 2024-08-16 LAB
ALBUMIN SERPL BCG-MCNC: 4 G/DL (ref 3.5–5.2)
ALP SERPL-CCNC: 94 U/L (ref 40–150)
ALT SERPL W P-5'-P-CCNC: 24 U/L (ref 0–50)
ANION GAP SERPL CALCULATED.3IONS-SCNC: 11 MMOL/L (ref 7–15)
AST SERPL W P-5'-P-CCNC: 31 U/L (ref 0–45)
BILIRUB SERPL-MCNC: 0.3 MG/DL
BUN SERPL-MCNC: 33 MG/DL (ref 8–23)
CALCIUM SERPL-MCNC: 9.5 MG/DL (ref 8.8–10.4)
CHLORIDE SERPL-SCNC: 102 MMOL/L (ref 98–107)
CREAT SERPL-MCNC: 0.7 MG/DL (ref 0.51–0.95)
EGFRCR SERPLBLD CKD-EPI 2021: 83 ML/MIN/1.73M2
ERYTHROCYTE [DISTWIDTH] IN BLOOD BY AUTOMATED COUNT: 15.4 % (ref 10–15)
GLUCOSE SERPL-MCNC: 156 MG/DL (ref 70–99)
HCO3 SERPL-SCNC: 25 MMOL/L (ref 22–29)
HCT VFR BLD AUTO: 38.9 % (ref 35–47)
HGB BLD-MCNC: 12.3 G/DL (ref 11.7–15.7)
MCH RBC QN AUTO: 32.5 PG (ref 26.5–33)
MCHC RBC AUTO-ENTMCNC: 31.6 G/DL (ref 31.5–36.5)
MCV RBC AUTO: 103 FL (ref 78–100)
PLATELET # BLD AUTO: 199 10E3/UL (ref 150–450)
POTASSIUM SERPL-SCNC: 4.2 MMOL/L (ref 3.4–5.3)
PROT SERPL-MCNC: 6.3 G/DL (ref 6.4–8.3)
RBC # BLD AUTO: 3.79 10E6/UL (ref 3.8–5.2)
SODIUM SERPL-SCNC: 138 MMOL/L (ref 135–145)
TSH SERPL DL<=0.005 MIU/L-ACNC: 1.8 UIU/ML (ref 0.3–4.2)
VIT B12 SERPL-MCNC: 338 PG/ML (ref 232–1245)
WBC # BLD AUTO: 5.9 10E3/UL (ref 4–11)

## 2024-08-16 PROCEDURE — 80053 COMPREHEN METABOLIC PANEL: CPT | Mod: ORL

## 2024-08-16 PROCEDURE — 84443 ASSAY THYROID STIM HORMONE: CPT | Mod: ORL

## 2024-08-16 PROCEDURE — 85027 COMPLETE CBC AUTOMATED: CPT | Mod: ORL

## 2024-08-16 PROCEDURE — 82607 VITAMIN B-12: CPT | Mod: ORL

## 2024-08-16 PROCEDURE — P9603 ONE-WAY ALLOW PRORATED MILES: HCPCS | Mod: ORL

## 2024-08-16 PROCEDURE — 36415 COLL VENOUS BLD VENIPUNCTURE: CPT | Mod: ORL

## 2024-11-16 ENCOUNTER — LAB REQUISITION (OUTPATIENT)
Dept: LAB | Facility: CLINIC | Age: 89
End: 2024-11-16
Payer: COMMERCIAL

## 2024-11-16 DIAGNOSIS — R82.90 UNSPECIFIED ABNORMAL FINDINGS IN URINE: ICD-10-CM

## 2024-11-16 LAB
ALBUMIN UR-MCNC: 20 MG/DL
APPEARANCE UR: CLEAR
BILIRUB UR QL STRIP: NEGATIVE
COLOR UR AUTO: YELLOW
GLUCOSE UR STRIP-MCNC: NEGATIVE MG/DL
HGB UR QL STRIP: NEGATIVE
HYALINE CASTS: 1 /LPF
KETONES UR STRIP-MCNC: NEGATIVE MG/DL
LEUKOCYTE ESTERASE UR QL STRIP: ABNORMAL
MUCOUS THREADS #/AREA URNS LPF: PRESENT /LPF
NITRATE UR QL: NEGATIVE
PH UR STRIP: 6.5 [PH] (ref 5–7)
RBC URINE: 1 /HPF
SP GR UR STRIP: 1.02 (ref 1–1.03)
TRANSITIONAL EPI: <1 /HPF
UROBILINOGEN UR STRIP-MCNC: 2 MG/DL
WBC URINE: 9 /HPF

## 2024-11-16 PROCEDURE — 81001 URINALYSIS AUTO W/SCOPE: CPT | Mod: ORL
